# Patient Record
Sex: MALE | Race: BLACK OR AFRICAN AMERICAN
[De-identification: names, ages, dates, MRNs, and addresses within clinical notes are randomized per-mention and may not be internally consistent; named-entity substitution may affect disease eponyms.]

---

## 2022-08-22 ENCOUNTER — HOSPITAL ENCOUNTER (EMERGENCY)
Dept: HOSPITAL 97 - ER | Age: 52
Discharge: HOME | End: 2022-08-22
Payer: SELF-PAY

## 2022-08-22 VITALS — SYSTOLIC BLOOD PRESSURE: 182 MMHG | OXYGEN SATURATION: 98 % | DIASTOLIC BLOOD PRESSURE: 106 MMHG | TEMPERATURE: 98 F

## 2022-08-22 DIAGNOSIS — I10: ICD-10-CM

## 2022-08-22 DIAGNOSIS — S01.511A: Primary | ICD-10-CM

## 2022-08-22 PROCEDURE — 99283 EMERGENCY DEPT VISIT LOW MDM: CPT

## 2022-08-22 NOTE — EDPHYS
Physician Documentation                                                                           

 [facility]                                                                 

Name: Ben Tovar                                                                              

Age: 51 yrs                                                                                       

Sex: Male                                                                                         

: 1970                                                                                   

MRN: [id number]                                                                                   

Arrival Date: 2022                                                                          

Time: 16:48                                                                                       

Account#: [id number]                                                                            

Bed 5                                                                                             

Private MD:                                                                                       

ED Physician Rafiq Negrete                                                                         

HPI:                                                                                              

                                                                                             

17:37 This 51 yrs old Black Male presents to ER via EMS with complaints of laceration .       kb  

17:37 The patient has a laceration related to: working, wrench slipped and hit pt in mouth    kb  

      causing laceration to upper lip. The laceration(s) is(are) located on the upper lip and     

      upper vermilion border. Onset: The symptoms/episode began/occurred today. Associated        

      signs and symptoms: The patient has no apparent associated signs or symptoms. The           

      patient has not experienced similar symptoms in the past. The patient has not recently      

      seen a physician.                                                                           

                                                                                                  

Historical:                                                                                       

- Allergies:                                                                                      

16:50 No Known Allergies;                                                                     [facility] 

- Home Meds:                                                                                      

16:50 lisinopril 1 mg/mL Oral soln 10 mL once daily [Active];                                 [facility] 

- PMHx:                                                                                           

16:50 Hypertensive disorder;                                                                  [facility] 

                                                                                                  

- Immunization history:: Adult Immunizations up to date.                                          

- Social history:: Smoking status: Patient denies any tobacco usage or history of.                

                                                                                                  

                                                                                                  

ROS:                                                                                              

17:36 Constitutional: Negative for fever, chills, and weight loss.                            kb  

17:36 Skin: Positive for laceration(s), of the upper lip and upper vermilion border.              

17:36 All other systems are negative.                                                             

                                                                                                  

Exam:                                                                                             

17:36 Constitutional:  This is a well developed, well nourished patient who is awake, alert,  kb  

      and in no acute distress. ENT:  Moist Mucous membranes Cardiovascular:  Regular rate        

      and rhythm with a normal S1 and S2.  No gallops, murmurs, or rubs.  No pulse deficits.      

      Respiratory:  Respirations even and unlabored. No increased work of breathing. Talking      

      in full sentences MS/ Extremity:  Pulses equal, no cyanosis.  Neurovascular intact.         

      Full, normal range of motion. Neuro:  Awake and alert, GCS 15, oriented to person,          

      place, time, and situation. Moves all extremities. Normal gait. Psych:  Awake, alert,       

      with orientation to person, place and time.  Behavior, mood, and affect are within          

      normal limits.                                                                              

17:36 Head/face: Noted is no obvious of injury or deformity except a laceration(s), of the        

      upper lip and upper vermilion border.                                                       

17:36 Skin: injury, laceration(s), the wound is approximately  2.5 cm(s), of the upper            

      vermilion border and upper lip, that can be described as clean, no foreign body,            

      linear, with mild bleeding.                                                                 

                                                                                                  

Vital Signs:                                                                                      

16:48  / 106; Pulse 82; Resp 17; Temp 98.0(O); Pulse Ox 98% ; Weight 95.25 kg; Height 6 jh6 

      ft. 0 in. (182.88 cm); Pain 3/10;                                                           

16:48 Body Mass Index 28.48 (95.25 kg, 182.88 cm)                                             jh6 

                                                                                                  

MDM:                                                                                              

16:51 Patient medically screened.                                                             kb  

17:35 Data reviewed: vital signs, nurses notes. Data interpreted: Pulse oximetry: on room air kb  

      is 98 %. Interpretation: normal. Counseling: I had a detailed discussion with the           

      patient and/or guardian regarding: the historical points, exam findings, and any            

      diagnostic results supporting the discharge/admit diagnosis, the need for outpatient        

      follow up, a family practitioner, to return to the emergency department if symptoms         

      worsen or persist or if there are any questions or concerns that arise at home.             

17:38 ED course: Pt does not want sutures placed here. States he wants to go see his company  judah orosco Pt is going to see him upon discharge from ER. .                                        

                                                                                                  

Administered Medications:                                                                         

No medications were administered                                                                  

                                                                                                  

                                                                                                  

Disposition:                                                                                      

17:42 Co-signature as Attending Physician, Rafiq Negrete MD.                                    rn  

                                                                                                  

Disposition Summary:                                                                              

22 16:51                                                                                    

Discharge Ordered                                                                                 

      Location: Home                                                                          kb  

      Condition: Stable                                                                       kb  

      Diagnosis                                                                                   

        - Laceration without foreign body of lip                                              kb  

      Followup:                                                                               kb  

        - With: Emergency Department                                                               

        - When: As needed                                                                          

        - Reason: Worsening of condition                                                           

      Followup:                                                                               kb  

        - With: Private Physician                                                                  

        - When: 2 - 3 days                                                                         

        - Reason: Recheck today's complaints, Continuance of care, Re-evaluation by your           

      physician                                                                                   

      Discharge Instructions:                                                                     

        - Discharge Summary Sheet                                                             kb  

        - Mouth Laceration, Easy-to-Read                                                      kb  

        - Laceration Care, Adult, Easy-to-Read                                                kb  

      Forms:                                                                                      

        - Medication Reconciliation Form                                                      kb  

        - Thank You Letter                                                                    kb  

        - Antibiotic Education                                                                kb  

        - Prescription Opioid Use                                                             kb  

Signatures:                                                                                       

Alisson Teresa, NATALIO-ROSA LANCE-Rafiq Peterson MD MD   rn                                                   

Hastedt, Delmis, RED                   RN   jh6                                                  

                                                                                                  

**************************************************************************************************

## 2022-08-22 NOTE — ER
Nurse's Notes                                                                                     

 [facility]                                                                 

Name: Ben Tovar                                                                              

Age: 51 yrs                                                                                       

Sex: Male                                                                                         

: 1970                                                                                   

MRN: [id number]                                                                                   

Arrival Date: 2022                                                                          

Time: 16:48                                                                                       

Account#: [id number]                                                                            

Bed 5                                                                                             

Private MD:                                                                                       

Diagnosis: Laceration without foreign body of lip                                                 

                                                                                                  

Presentation:                                                                                     

                                                                                             

16:48 Chief complaint: Patient states: using a wrench to tighten a bolt and wrench slipped    jh6 

      hitting pt in upper lip. pt has 2cm lac to l upper lip. bleeding controlled pta and -       

      loc. Coronavirus screen: Vaccine status: Patient reports receiving the 2nd dose of the      

      covid vaccine. Ebola Screen: Patient negative for fever greater than or equal to 101.5      

      degrees Fahrenheit, and additional compatible Ebola Virus Disease symptoms Patient          

      denies exposure to infectious person. Patient denies travel to an Ebola-affected area       

      in the 21 days before illness onset. Initial Sepsis Screen: Does the patient meet any 2     

      criteria? No. Patient's initial sepsis screen is negative. Does the patient have a          

      suspected source of infection? No. Patient's initial sepsis screen is negative. Risk        

      Assessment: Do you want to hurt yourself or someone else? Patient reports no desire to      

      harm self or others. Onset of symptoms was 2022.                                 

16:48 Method Of Arrival: EMS: [facility]                                                         jh6 

16:48 Acuity: VEENA 4                                                                           jh6 

                                                                                                  

Triage Assessment:                                                                                

16:51 General: Appears in no apparent distress. Behavior is calm, cooperative. Pain:          [facility] 

      Complains of pain in upper vermilion border and upper lip Pain currently is 3 out of 10     

      on a pain scale. Quality of pain is described as aching.                                    

                                                                                                  

Historical:                                                                                       

- Allergies:                                                                                      

16:50 No Known Allergies;                                                                     [facility] 

- Home Meds:                                                                                      

16:50 lisinopril 1 mg/mL Oral soln 10 mL once daily [Active];                                 [facility] 

- PMHx:                                                                                           

16:50 Hypertensive disorder;                                                                  [facility] 

                                                                                                  

- Immunization history:: Adult Immunizations up to date.                                          

- Social history:: Smoking status: Patient denies any tobacco usage or history of.                

                                                                                                  

                                                                                                  

Screenin:52 Abuse screen: Denies threats or abuse. Denies injuries from another. Nutritional        [facility] 

      screening: No deficits noted. Tuberculosis screening: No symptoms or risk factors           

      identified. Fall Risk None identified.                                                      

                                                                                                  

Assessment:                                                                                       

16:52 General: Appears see triage note. pt is stating that he is not wanting stitches placed  jh6 

      here in the er, that he would like to go to company  today.                               

                                                                                                  

Vital Signs:                                                                                      

16:48  / 106; Pulse 82; Resp 17; Temp 98.0(O); Pulse Ox 98% ; Weight 95.25 kg; Height 6 jh6 

      ft. 0 in. (182.88 cm); Pain 3/10;                                                           

16:48 Body Mass Index 28.48 (95.25 kg, 182.88 cm)                                             6 

                                                                                                  

ED Course:                                                                                        

16:48 Patient arrived in ED.                                                                  6 

16:48 Delmis Stern, RN is Primary Nurse.                                                 6 

16:50 Triage completed.                                                                       6 

16:50 [facility]                                                        kb  

16:50 Rafiq Negrete MD is Attending Physician.                                                kb  

16:50 Bed in low position. Call light in reach.                                               jh6 

16:51 Arm band placed on right wrist. Patient placed in the treatment room, on a stretcher.   jh6 

16:53 No provider procedures requiring assistance completed.                                  jh6 

16:54 Patient did not have IV access during this emergency room visit.                        6 

                                                                                                  

Administered Medications:                                                                         

No medications were administered                                                                  

                                                                                                  

                                                                                                  

Medication:                                                                                       

16:54 VIS not applicable for this client.                                                     6 

                                                                                                  

Outcome:                                                                                          

16:51 Discharge ordered by MD.                                                                kb  

16:53 Discharged to home ambulatory.                                                          jh6 

16:53 Condition: good                                                                             

16:53 Discharge instructions given to patient, pts company representative.  Instructed on         

      discharge instructions, Demonstrated understanding of instructions, follow-up care.         

16:54 Patient left the ED.                                                                    [facility] 

                                                                                                  

Signatures:                                                                                       

Alisson Teresa FNP-C FNP-Ckb Hastedt, Jennifer, RN                   RN   [facility]                                                  

                                                                                                  

**************************************************************************************************

## 2022-08-22 NOTE — XMS REPORT
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                               tablet by           ity of



     tablet      00:00:                               mouth           [place]



               00                                 daily.           Medical



                                                                 Branch

 

     lisinopriL      2020      Yes       [id number] 10mg      Take 1           U

nivers



     10 mg      1-13                               tablet by           ity of



     tablet      00:00:                               mouth           [place]



               00                                 daily.           Medical



                                                                 Branch

 

     amLODIPine      2020      Yes       [id number] 10mg      Take 1           U

nivers



     10 mg      1-13                               tablet by           ity of



     tablet      00:00:                               mouth           [place]



               00                                 daily.           Medical



                                                                 Branch

 

     lisinopriL      2020      Yes       [id number] 10mg      Take 1           U

nivers



     10 mg      1-13                               tablet by           ity of



     tablet      00:00:                               mouth           [place]



               00                                 daily.           Medical



                                                                 Branch

 

     amLODIPine      2020      Yes       [id number] 10mg      Take 1           U

nivers



     10 mg      1-13                               tablet by           ity of



     tablet      00:00:                               mouth           [place]



               00                                 daily.           Medical



                                                                 Branch

 

     lisinopriL      2020      Yes       [id number] 10mg      Take 1           U

nivers



     10 mg      1-13                               tablet by           ity of



     tablet      00:00:                               mouth           [place]



               00                                 daily.           Medical



                                                                 Branch

 

     amLODIPine      2020- No        [id number] 10mg      Take 1           

[facility]



     10 mg      1-13 02-11                          tablet by           ity of



     tablet      00:00: 00:00                          mouth           [place]



               00   :00                           daily.           Medical



                                                                 Branch

 

     lisinopriL      2020- No        [id number] 10mg      Take 1           

[facility]



     10 mg      1-13 02-11                          tablet by           ity of



     tablet      00:00: 00:00                          mouth           [place]



               00   :00                           daily.           Medical



                                                                 Branch

 

     amLODIPine      2017      Yes            10mg      Take 1           [facility]

rs



     10 mg      0-27                               tablet by           ity of



     tablet      00:00:                               mouth           [place]



               00                                 daily.           Medical



                                                                 Branch

 

     amLODIPine      2017 2020- No             10mg      Take 1           Univ

ers



     10 mg      0-27 11-13                          tablet by           ity of



     tablet      00:00: 00:00                          mouth           [place]



               00   :00                           daily.           Medical



                                                                 Branch

 

     amLODIPine      2017 2020- No             10mg      Take 1           Univ

ers



     10 mg      0-27 11-13                          tablet by           ity of



     tablet      00:00: 00:00                          mouth           [place]



               00   :00                           daily.           Medical



                                                                 Branch

 

     hydrochloro      2017      Yes            25mg      Take 2.5           Un

[facility]



     thiazide      0-19                               mL by           ity of



     (ESIDRIX)      00:00:                               mouth           [place]



     10 mg/mL      00                                 every           Medical



     oral                                         morning.           Branch



     suspension                                                        

 

     ascorbic      2017      Yes            300mg      Take 3 mL           Uni

vers



     acid      0-19                               by mouth           ity of



     (VITAMIN C)      00:00:                               daily.           [place]

[facility]



     100 mg/mL      00                                                Medical



     oral                                                        Branch



     solution                                                        

 

     ZINC      2017      Yes            220mg      Take 1           [facility]



     SULFATE 220      0-19                               capsule by           it

y of



     (50) mg      00:00:                               mouth 3           [place]



     capsule      00                                 (three)           Medical



                                                  times           Branch



                                                  daily.           

 

     hydrochloro      2017 2020- No             25mg      Take 2.5           U

nivers



     thiazide      0-19 11-13                          mL by           ity of



     (ESIDRIX)      00:00: 00:00                          mouth           [place]



     10 mg/mL      00   :00                           every           Medical



     oral                                         morning.           Branch



     suspension                                                        

 

     ascorbic      2017 2020- No             300mg      Take 3 mL           Un

[facility]



     acid      0-19 11-13                          by mouth           ity of



     (VITAMIN C)      00:00: 00:00                          daily.           [facility]

as



     100 mg/mL      00   :00                                          Medical



     oral                                                        Branch



     solution                                                        

 

     ZINC      2017 2020- No             220mg      Take 1           [facility]



     SULFATE 220      0-19 11-13                          capsule by           i

ty of



     (50) mg      00:00: 00:00                          mouth 3           [place]



     capsule      00   :00                           (three)           Medical



                                                  times           Branch



                                                  daily.           

 

     hydrochloro      2017 2020- No             25mg      Take 2.5           U

nivers



     thiazide      0-19 11-13                          mL by           ity of



     (ESIDRIX)      00:00: 00:00                          mouth           [place]



     10 mg/mL      00   :00                           every           Medical



     oral                                         morning.           Branch



     suspension                                                        

 

     ascorbic      2017 2020- No             300mg      Take 3 mL           Un

[facility]



     acid      0-19 11-13                          by mouth           ity of



     (VITAMIN C)      00:00: 00:00                          daily.           [facility]

as



     100 mg/mL      00   :00                                          Medical



     oral                                                        Branch



     solution                                                        

 

     ZINC      2017 2020- No             220mg      Take 1           [facility]



     SULFATE 220      0-19 11-13                          capsule by           i

ty of



     (50) mg      00:00: 00:00                          mouth 3           [place]



     capsule      00   :00                           (three)           Medical



                                                  times           Branch



                                                  daily.           

 

     No known                No                                      [facility]



     medications                                                        ity of



                                                                 [facility]

 

     No known                No                                      [facility]



     medications                                                        it of



                                                                 [facility]







Immunizations







           Ordered    Filled Immunization Date       Status     Comments   [facility]

e



           Immunization Name Name                                        

 

           Moderna COVID-19 Moderna COVID-19 2021-10-25 Completed             



           Vaccine    Vaccine    00:00:00                         

 

           Moderna COVID-19 Moderna COVID-19 2021 Completed             



           Vaccine    Vaccine    00:00:00                         

 

           Zoster Vaccine            2020 Completed             [facility]

 of



           Recombinant            00:00:00                         [facility]

 

           Zoster Vaccine            2020 Completed             [facility]

 of



           Recombinant            00:00:00                         [facility]

 

           Zoster Vaccine            2020 Completed             [facility]

 of



           Recombinant            00:00:00                         [facility]

 

           Zoster Vaccine            2020 Completed             [facility]

 of



           Recombinant            00:00:00                         [facility]

 

           Zoster Vaccine            2020 Completed             [facility]

 of



           Recombinant            00:00:00                         [facility]

 

           Zoster Vaccine            2020 Completed             [facility]

 of



           Recombinant            00:00:00                         [facility]

 

           Zoster Vaccine            2020 Completed             [facility]

 of



           Recombinant            00:00:00                         [facility]

 

           Zoster Vaccine            2020 Completed             [facility]

 of



           Recombinant            00:00:00                         [facility]

 

           Zoster Vaccine            2020 Completed             [facility]

 of



           Recombinant            00:00:00                         [facility]

 

           Zoster Vaccine            2020 Completed             [facility]

 of



           Recombinant            00:00:00                         [facility]

 

           Zoster Vaccine            2020 Completed             [facility]

 of



           Recombinant            00:00:00                         [facility]

 

           Zoster Vaccine            2020 Completed             [facility]

 of



           Recombinant            00:00:00                         [facility]

 

           Zoster Vaccine            2020 Completed             [facility]

 of



           Recombinant            00:00:00                         [facility]

 

           Zoster Vaccine            2020 Completed             [facility]

 of



           Recombinant            00:00:00                         [facility]

 

           Influenza Virus            2020 Completed             Universit

y of



           Vaccine Quad .5 mL            00:00:00                         [facility]



           IM 6+ MO                                               Branch

 

           Influenza Virus            2020 Completed             Universit

y of



           Vaccine Quad .5 mL            00:00:00                         [place] 

[facility]



           IM 6+ MO                                               Branch

 

           Influenza Virus            2020 Completed             Universit

y of



           Vaccine Quad .5 mL            00:00:00                         [facility]



           IM 6+ MO                                               Branch

 

           Influenza Virus            2020 Completed             Universit

y of



           Vaccine Quad .5 mL            00:00:00                         [facility]



           IM 6+ MO                                               Branch

 

           Influenza Virus            2020 Completed             Universit

y of



           Vaccine Quad .5 mL            00:00:00                         [facility]



           IM 6+ MO                                               Branch

 

           Influenza Virus            2020 Completed             Universit

y of



           Vaccine Quad .5 mL            00:00:00                         [place] 

[facility]



           IM 6+ MO                                               Branch

 

           Influenza Virus            2020 Completed             Universit

y of



           Vaccine Quad .5 mL            00:00:00                         [place] 

[facility]



           IM 6+ MO                                               Branch

 

           Influenza Virus            2020 Completed             Universit

y of



           Vaccine Quad .5 mL            00:00:00                         [place] 

[facility]



           IM 6+ MO                                               Branch

 

           Influenza Virus            2020 Completed             Universit

y of



           Vaccine Quad .5 mL            00:00:00                         [place] 

[facility]



           IM 6+ MO                                               Branch

 

           Influenza Virus            2020 Completed             Universit

y of



           Vaccine Quad .5 mL            00:00:00                         [place] 

[facility]



           IM 6+ MO                                               Branch

 

           Influenza Virus            2020 Completed             Universit

y of



           Vaccine Quad .5 mL            00:00:00                         [place] 

[facility]



           IM 6+ MO                                               Branch

 

           Influenza Virus            2020 Completed             Universit

y of



           Vaccine Quad .5 mL            00:00:00                         [place] 

[facility]



           IM 6+ MO                                               Branch

 

           Influenza Virus            2020 Completed             Universit

y of



           Vaccine Quad .5 mL            00:00:00                         [place] 

[facility]



           IM 6+ MO                                               Branch

 

           Influenza Virus            2020 Completed             Universit

y of



           Vaccine Quad .5 mL            00:00:00                         [place] 

[facility]



           IM 6+ MO                                               Branch

 

           Influenza Virus            2020 Completed             Universit

y of



           Vaccine Quad .5 mL            00:00:00                         [place] 

[facility]



           IM 6+ MO                                               Branch

 

           Influenza Virus            2020 Completed             Universit

y of



           Vaccine Quad .5 mL            00:00:00                         [place] 

[facility]



           IM 6+ MO                                               Branch

 

           Influenza Virus            2020 Completed             Universit

y of



           Vaccine Quad .5 mL            00:00:00                         [place] 

[facility]



           IM 6+ MO                                               Branch

 

           Influenza Virus            2020 Completed             Universit

y of



           Vaccine Quad .5 mL            00:00:00                         [place] 

[facility]



           IM 6+ MO                                               Branch

 

           TDAP                  2017-10-01 Completed             [facility] of



                                 00:00:00                         [facility]

 

           TDAP                  2017-10-01 Completed             [facility] of



                                 00:00:00                         [facility]

 

           TDAP                  2017-10-01 Completed             [facility]



                                 00:00:00                         [facility]

 

           TDAP                  2017-10-01 Completed             [facility]



                                 00:00:00                         [facility]

 

           TDAP                  2017-10-01 Completed             [facility]



                                 00:00:00                         [facility]

 

           TDAP                  2017-10-01 Completed             [facility]



                                 00:00:00                         [facility]

 

           TDAP                  2017-10-01 Completed             [facility]



                                 00:00:00                         [place] [facility]



                                                                  Branch

 

           TDAP                  2017-10-01 Completed             [facility]



                                 00:00:00                         [place] [facility]



                                                                  Branch

 

           TDAP                  2017-10-01 Completed             [facility]



                                 00:00:00                         [place] [facility]



                                                                  Branch

 

           TDAP                  2017-10-01 Completed             [facility]



                                 00:00:00                         [place] [facility]



                                                                  Branch

 

           TDAP                  2017-10-01 Completed             [facility]



                                 00:00:00                         [place] [facility]



                                                                  Branch

 

           TDAP                  2017-10-01 Completed             [facility]



                                 00:00:00                         [place] [facility]



                                                                  Branch

 

           TDAP                  2017-10-01 Completed             [facility]



                                 00:00:00                         [place] [facility]



                                                                  Branch

 

           TDAP                  2017-10-01 Completed             [facility]



                                 00:00:00                         [place] [facility]



                                                                  Branch

 

           TDAP                  2017-10-01 Completed             [facility]



                                 00:00:00                         [place] [facility]



                                                                  Branch

 

           TDAP                  2017-10-01 Completed             [facility]



                                 00:00:00                         [place] [facility]



                                                                  Branch

 

           TDAP                  2017-10-01 Completed             [facility]



                                 00:00:00                         [place] [facility]



                                                                  Branch

 

           TDAP                  2017-10-01 Completed             [facility]



                                 00:00:00                         [place] [facility]



                                                                  Branch

 

           TDAP                  2017-10-01 Completed             [facility]



                                 00:00:00                         [place] [facility]



                                                                  Branch

 

           TDAP                  2017-10-01 Completed             [facility]



                                 00:00:00                         [facility]







Vital Signs







             Vital Name   Observation Time Observation Value Comments     Source

 

             Systolic blood 2022 15:50:00 104 mm[Hg]                [facility]

sity of



             pressure                                            [facility]

 

             Diastolic blood 2022 15:50:00 71 mm[Hg]                 Unive

rsity of



             pressure                                            [facility]

 

             Heart rate   2022 15:50:00 97 /min                   [facility]

 

             Body temperature 2022 15:50:00 36.83 Sherry                 [facility]



                                                                 [facility]

 

             Respiratory rate 2022 15:50:00 18 /min                   [facility]



                                                                 [place] [facility]



                                                                 Branch

 

             Oxygen saturation in 2022 15:50:00 99 /min                   

University 



             Arterial blood by                                        [place] [facility]

jalil



             Pulse oximetry                                        Branch

 

             Systolic blood 2021 18:25:00 129 mm[Hg]                Univer

sity of



             pressure                                            [facility]

 

             Diastolic blood 2021 18:25:00 72 mm[Hg]                 Unive

rsity of



             pressure                                            [facility]

 

             [facility]   2021 18:25:00 102 /min                  [facility]

 

             Body temperature 2021 18:25:00 36.61 Sherry                 [facility]

[facility]

 

             Respiratory rate 2021 18:25:00 20 /min                   [facility]

 

             Oxygen saturation in 2021 18:25:00 100 /min                  

[facility] of



             Arterial blood by                                        [place] [facility]

jalil



             Pulse oximetry                                        Branch

 

             Systolic blood 2020 19:07:00 150 mm[Hg]                Univer

sity of



             pressure                                            [facility]

 

             Diastolic blood 2020 19:07:00 100 mm[Hg]                Unive

rsity of



             pressure                                            [facility]

 

             Heart rate   2020 19:07:00 85 /min                   [facility]

[facility]

 

             Respiratory rate 2020 18:56:00 15 /min                   [facility]

[facility]

 

             Body height  2020 18:56:00 182.9 cm                  [facility]

 

             Body weight  2020 18:56:00 95.573 kg                 [facility]

 

             BMI          2020 18:56:00 28.58 kg/m2               [facility]

 

             Oxygen saturation in 2020 18:56:00 98 /min                   

[employer] [facility]



             Arterial blood by                                        [place] [facility]

jalil



             Pulse oximetry                                        Branch







Procedures







                Procedure       Date / Time Performed Performing Clinician [facility]

e

 

                VARICELLA-ZOSTER 2020 21:40:31 Lissette Rudd [facility]



                VACCINE, (SHINGRIX) 50                                 Medical B

ranch



                MCG/0.5 ML, IM                                  

 

                COVID-19 (MOLECULAR 2020 20:26:00 Lissette Rudd [facility]



                 NUCLEIC ACID                                   



                AMPLIFICATION)                                  

 

                FLU VACC ([id number]), 2020 19:06:36 Lissette Rudd Uni

[facility] [place]



                6+ MONTHS, IM, QUAD                                 Medical Bran

ch

 

                ASSIGNMENT OF BENEFITS 2020 18:45:49 Doctor Unassigned, No

 [facility]







Encounters







        Start   End     Encounter Admission Attending Care    Care    Encounter 

Source



        Date/Time Date/Time Type    Type    Clinicians Facility Department ID   

   

 

        2021-12-10         Inpatient         Frantz Calderon [facility]   [id number]

200 [facility]



        16:00:00                                                 32      

 

        2022-02-10 2022-02-10 JEANETTE Clay    [id number]

[id number] Univers



        00:00:00 00:00:00                 Katie PERES [facility]  350.1.13.10       

  ity of



                                                [place]   [id number]         [facility]    [phone]         Medi

jalil



                                                PRIMARY & 370             Branch



                                                SPECIALTY                 



                                                CARE                    

 

        2022 Refbaylee Brock [facility]    1.2.840.114 [id number]

[id number] Univers



        00:00:00 00:00:00                 , Emeli [facility]  350.1.13.10         

ity of



                                                [place]   [id number]         [facility]    [phone]         Medi

jalil



                                                PRIMARY & 365             Branch



                                                SPECIALTY                 



                                                CARE                    

 

        2022 Outpatient R       JAE [facility]    [id number]

[id number] Univers



        09:15:00 10:12:11                 KENNIKQUA                         ity 

[facility]

 

        2022 Urgent          Katie Araya [facility]    1.2.

840.114 [id number] 

Univers



        09:15:00 10:12:11 Care            Unknown, Attending HEALTH  350.1.13.10

         ity of



                                                [place]   [id number]         [facility]    [phone]         Medi

jalil



                                                PRIMARY & 370             Branch



                                                SPECIALTY                 



                                                CARE                    

 

        2022 Outpatient R               [facility]    [id number]

-20 Univers



        09:15:00 09:15:00                                         [id number]  ity [facility]

 

        2021 Outpatient Elective Frantz Calderon [facility]   JM

[id number] 

[facility]



        14:24:00 18:30:00                                         90      

 

        2021 Nurse           Nurse, Rikki Jenkins [facility]    1.2.840.

114 [id number] Univers



        11:52:33 14:14:25 Visit           Unknown, Attending HEALTH  350.1.13.10

         ity of



                                        Elizabeth Pinto   4.2.7.2.686    

     [place]



                                                CITY    [phone]         Medi

jalil



                                                PRIMARY & 370             Branch



                                                SPECIALTY                 



                                                CARE                    

 

        2021 Outpatient R               [facility]    [id number]

-20 Univers



        13:00:00 13:00:00                                         [id number]  ity [facility]

 

        2021 [facility]       [facility]    [id number]

[id number] Univers



        13:00:00 13:00:00                 ELIZABETH                         ity [facility]

 

        2021 Outpatient Urgent  Frantz Calderon [facility]   [id number]

[id number] [facility]



        15:16:00 18:10:00                                         12      

 

        2021 Outpatient Elective Frantz Calderon [facility]   JM

[id number] 

[facility]



        12:34:00 12:34:00                                         54      

 

        2021-10-25 2021-10-25 Outpatient                 GCCOVIDV GCCOVIDV [id number]

[id number] GCCOVID



        00:00:00 00:00:00                                                 V

 

        2021 Telephone         Only, Rikki [facility]    1.2.840.114 [id number]

[id number] Univers



        00:00:00 00:00:00                 Uc Test HEALTH  350.1.13.10         it

y of



                                                [place]   4.2.7.2.686         [facility]    [phone]         Medi

jalil



                                                Primary & 370             Branch



                                                Specialty                 



                                                Care                    

 

        2021 Telephone         INDIO Iglesias    [id number]

[id number] Univers



        00:00:00 00:00:00                 Ashleigh DURHAM   350.1.13.10         it

y of



                                                [facility] 4.2.7.2.686         Rikki

as



                                                        [phone]         [address]

 

        2021 Outpatient R       CINDY [facility]    [id number]

[id number] Univers



        09:45:00 11:09:41                 WES                          ity [facility]

 

        2021 Outpatient R               [facility]    [id number]

-20 Univers



        09:45:00 09:45:00                                         [id number]  ity [facility]

 

        2021 Laboratory         Only, Rikki Uc Test [facility]    1.2.8

40.114 [id number] 

Univers



        09:12:34 09:27:34 Only            Unknown, Attending HEALTH  350.1.13.10

         ity 



                                        [facility] [place]   4.2.7.2.686    

     [place]



                                                City    [phone]         Medi

jalil



                                                Primary & 370             Branch



                                                Specialty                 



                                                Care                    

 

        2021 Refbaylee Brock [facility]    1.2.840.114 [id number]

[id number] Univers



        00:00:00 00:00:00                 , [facility]  350.1.13.10         

ity of



                                                [place]   4.2.7.2.686         [facility]    [phone]         Medi

jalil



                                                Primary & 365             Branch



                                                Specialty                 



                                                Care                    

 

        2021 Refill          [facility]    1.2.840.114 [id number]

[id number] Univers



        00:00:00 00:00:00                 , [facility]  350.1.13.10         

ity of



                                                [place]   4.2.7.2.686         [facility]    [phone]         Medi

jalil



                                                Primary & 365             Branch



                                                Specialty                 



                                                Care                    

 

        2021 Outpatient                 GCCOVIDV GCCOVIDV [id number]

[id number] GCCOVID



        00:00:00 00:00:00                                                 V

 

        2021-02-15 2021-02-15 [facility]       [facility]    1934

82P-20 Univers



        10:00:00 10:00:00                 [facility]                 [id number]  [facility]

 

        2021-02-15 2021-02-15 [facility] R       [facility]    [id number]

[id number] Univers



        10:00:00 10:00:00                 [facility]

 

        2021 [facility]          [facility]    1.2.840.114 [id number]

[id number] Univers



        00:00:00 00:00:00                 [facility]  350.1.13.10         it

y of



                                                [place]   4.2.7.2.686         [facility]    [phone]         Medi

jalil



                                                Primary & 365             Branch



                                                Specialty                 



                                                Care                    

 

        2021 Outpatient R               [facility]    [id number]

-20 Univers



        15:30:00 15:30:00                                         [id number]  ity [facility]

 

        2021 Outpatient R               [facility]    [id number]

[id number] Univers



        15:30:00 15:30:00                                                 [facility]

 

        2020 Nurse           Nurse, [facility]    1.2.840.114

 [id number] Univers



        15:26:10 15:48:15 Visit           [facility]  350.1.13.10

         ity of



                                                [place]   4.2.7.2.686         [facility]    [phone]         Medi

jalil



                                                Primary & 231             Branch



                                                Specialty                 



                                                Care                    

 

        2020 Outpatient R               [facility]    [id number]

-20 Univers



        15:30:00 15:30:00                                         [id number]  ity [facility]

 

        2020 [facility]patient R       [facility]    1029

[id number] Univers



        15:30:00 15:30:00                 [facility]                         ity [facility]

 

        2020 Letter          Cr [facility] [id number]

[id number] Univers



        00:00:00 00:00:00 (Out)           Dean LEE ICABRIAN    350.1.13.10        

 ity of



                                                [facility] 4.2.7.2.686         Rikki

as



                                                [facility]    [phone]         Medi

jalil



                                                        020             Branch

 

        2020 Letter          Nurse, [facility]    1.2.840.114 [id number]

[id number] Univers



        00:00:00 00:00:00 (Out)           Urgent  HEALTH  350.1.13.10         it

y of



                                                [place]   4.2.7.2.686         [facility]    [phone]         Medi

jalil



                                                Primary & 370             Branch



                                                Specialty                 



                                                Care                    

 

        2020 Outpatient         [facility]    1934

82P-20 Univers



        15:00:00 15:00:00                 [facility]                 2011  [facility]

 

        2020 Outpatient R       CINDY [facility]    [id number]

367 Univers



        14:45:00 14:45:00                 WES                          ity [facility]

 

        2020 Technician         Lab, [facility]    1.2.840.11

[id number] Univers



        13:46:18 14:01:18 Visit           Tobin [facility]  350.1.13.10

         ity of



                                                [place]   4.2.7.2.686         [facility]    [phone]         Medi

jalil



                                                Primary & 357             Branch



                                                Specialty                 



                                                Care                    

 

        2020 Office          [facility]    1.2.840.114 [id number]

[id number] Univers



        12:48:37 13:44:44 [facility]           [facility]  350.1.13.10         it

y of



                                                [place]   4.2.7.2.686         [facility]    [phone]         Medi

jalil



                                                Primary & 365             Branch



                                                Specialty                 



                                                Care                    

 

        2020 Laboratory         Only, Rikki Test [facility]    1.2.840.

114 [id number] 

[facility]



        12:48:46 13:03:46 Only            [facility]  350.1.13.10  

       ity of



                                                [place]   4.2.7.2.686         [facility]    [phone]         Medi

jalil



                                                Primary & 357             Branch



                                                Specialty                 



                                                Care                    

 

        2020 Orders          Doctor  INDIO    1.2.840.114 [id number]

[id number] 



        00:00:00 00:00:00 Only            Unassigned, HERMINIO   350.1.13.10       

  ity of



                                        [facility] 4.2.7.2.686         Rikki

as



                                                        [phone]         Medi

jalil



                                                        009             Branch







Results







           Test Description Test Time  Test Comments Results    Result Comments 

Source









                                        COVID-19 (MOLECULAR TESTING 



                          2020 07:39:00       



                     NUCLEIC ACID AMPLIFICATION)                     









                      Test Item  Value      Reference Range Interpretation Comme

nts









             SARS-CoV-2 NAAT (test code = Not Detected Not Detected             

 



             53429-8)                                            

 

             ANTOINE (test code = ANTOINE) [employer] Aptima SARS-CoV-2 Assay              

             



                          is a nucleic acid amplification                       

    



                          test intended for the qualitative                     

      



                          detection of RNA from SARS-CoV-2                      

     



                          from nasopharyngeal (NP)                           



                          specimens. ?It is used under                          

 



                          Emergency Use Authorization (EUA)                     

      



                          by FDA. A positive result is                          

 



                          indicative of the presence of                         

  



                          SARS-CoV-2 RNA. ?Clinical                           



                          correlation with patient history                      

     



                          and other diagnostic information                      

     



                          is necessary to determine patient                     

      



                          infection status. A negative (Not                     

      



                          Detected) result does not                           



                          preclude SARS-CoV-2 infection.                        

   



                          ?Clinical correlation with                           



                          patient history and other                           



                          diagnostic information should be                      

     



                          used in patient management                           



                          decisions. Invalid: Unable to                         

  



                          generate a valid test result on                       

    



                          this specimen. ?Please submit a                       

    



                          new specimen for repeat testing                       

    



                          if clinically indicated.                           

 

             Lab Interpretation (test code = Normal                             

    



             15491-4)                                            



[facility]